# Patient Record
Sex: FEMALE | Race: WHITE | Employment: OTHER | URBAN - NONMETROPOLITAN AREA
[De-identification: names, ages, dates, MRNs, and addresses within clinical notes are randomized per-mention and may not be internally consistent; named-entity substitution may affect disease eponyms.]

---

## 2022-05-24 ENCOUNTER — OFFICE VISIT (OUTPATIENT)
Dept: CARDIOLOGY CLINIC | Age: 77
End: 2022-05-24
Payer: MEDICARE

## 2022-05-24 VITALS
WEIGHT: 184.4 LBS | DIASTOLIC BLOOD PRESSURE: 54 MMHG | HEIGHT: 62 IN | BODY MASS INDEX: 33.93 KG/M2 | SYSTOLIC BLOOD PRESSURE: 128 MMHG | HEART RATE: 56 BPM

## 2022-05-24 DIAGNOSIS — R00.0 TACHYCARDIA: Primary | ICD-10-CM

## 2022-05-24 PROCEDURE — 99204 OFFICE O/P NEW MOD 45 MIN: CPT | Performed by: INTERNAL MEDICINE

## 2022-05-24 PROCEDURE — 93000 ELECTROCARDIOGRAM COMPLETE: CPT | Performed by: INTERNAL MEDICINE

## 2022-05-24 RX ORDER — ASPIRIN 81 MG/1
81 TABLET ORAL DAILY
COMMUNITY

## 2022-05-24 RX ORDER — CHLORAL HYDRATE 500 MG
1000 CAPSULE ORAL DAILY
COMMUNITY

## 2022-05-24 RX ORDER — M-VIT,TX,IRON,MINS/CALC/FOLIC 27MG-0.4MG
1 TABLET ORAL DAILY
COMMUNITY

## 2022-05-24 RX ORDER — PANTOPRAZOLE SODIUM 20 MG/1
20 TABLET, DELAYED RELEASE ORAL DAILY
COMMUNITY

## 2022-05-24 RX ORDER — LOSARTAN POTASSIUM 50 MG/1
50 TABLET ORAL 2 TIMES DAILY
COMMUNITY

## 2022-05-24 RX ORDER — HYDROCHLOROTHIAZIDE 25 MG/1
25 TABLET ORAL 2 TIMES DAILY
COMMUNITY

## 2022-05-24 RX ORDER — BUDESONIDE, GLYCOPYRROLATE, AND FORMOTEROL FUMARATE 160; 9; 4.8 UG/1; UG/1; UG/1
AEROSOL, METERED RESPIRATORY (INHALATION) PRN
COMMUNITY

## 2022-05-24 RX ORDER — FENOFIBRATE 160 MG/1
160 TABLET ORAL DAILY
COMMUNITY

## 2022-05-24 RX ORDER — AMLODIPINE BESYLATE 5 MG/1
5 TABLET ORAL 2 TIMES DAILY
COMMUNITY

## 2022-05-24 RX ORDER — EZETIMIBE 10 MG/1
10 TABLET ORAL DAILY
COMMUNITY

## 2022-05-24 RX ORDER — ASCORBIC ACID 1000 MG
50 TABLET ORAL DAILY
COMMUNITY

## 2022-05-24 NOTE — PROGRESS NOTES
[address] [facility]  Dept: [phone]  Cardiac Electrophysiology: Consultation Note  Patient's demographics:  Date:   5/24/2022  Patient name:              Kia De Jesus  YOB: 1945  Sex:    female   MRN:   [id number]    Primary Care Physician:  No primary care provider on file. Referring Physician:  NA    Reason for Consultation:  Arrhythmia. Clinical Summary:  76/F was apparently alright until 5/15/2022 when while resting at home she started to have sudden onset of palpitation. She checked her heart rate was in the range of 130s to 140 bpm.  Her symptoms persisted for couple of hours before reporting to the local emergency room. She was noted to be in atrial flutter with rapid ventricular rate (130 bpm). She was started on intravenous Cardizem and spontaneously converted to sinus rhythm. She was referred here for further evaluation. The patient reports having palpitations for many years. Her symptoms normally would last for few minutes and spontaneously subside. Palpitations are usually associated with shortness of breath. Denies chest pain, dizziness, syncope, lower extremity swelling. No documented arrhythmias in the past.  She had an abnormal stress test a year ago and received 2 coronary stents in the same artery. Medical Hx: CAD/PCI (4/2021)-abnormal stress test, HTN, HPL, sleep apnea/CPAP, obesity, GERD, OA and axiety/depression. Review of systems:  Constitutional: Negative for chills and fever  HENT: Negative for congestion, sinus pressure, sneezing and sore throat. Eyes: Negative for pain, discharge, redness and itching. Respiratory: Negative for apnea, cough  Gastrointestinal: Negative for blood in stool, constipation, diarrhea   Endocrine: Negative for cold intolerance, heat intolerance, polydipsia. Genitourinary: Negative for dysuria, enuresis, flank pain and hematuria. Musculoskeletal: Negative for arthralgias, joint swelling and neck pain. Neurological: Negative for numbness and headaches. Psychiatric/Behavioral: Negative for agitation, confusion, decreased concentration and dysphoric mood. Past Medical History[de-identified]  CAD/PCI (4/2021)-abnormal stress test, HTN, HPL, sleep apnea/CPAP, obesity, GERD, OA and axiety/depression. Past Surgical History:  · CORE BIOPSY OF THE BREAST Left 9/22/2021   · CORONARY STENT PLACEMENT 04/13/2021   · COLONOSCOPY DIAGNOSTIC N/A 07/17/2020   Laterality: N/A; Surgeon: Troy Nicole MD; [facility]  · KNEE SURGERY 04/05/2017   · HYSTERECTOMY Bilateral 01/01/1992   · OOPHORECTOMY Bilateral 1992   · TONSILLECTOMY     Family History:  Denies family history of premature coronary artery disease, arrhythmias or sudden cardiac death. Social History:  AT . Denies smoking or alcohol use. She has 2 grownup children. Social History     Socioeconomic History    Marital status:      Spouse name: Not on file    Number of children: Not on file    Years of education: Not on file    Highest education level: Not on file   Occupational History    Not on file   Tobacco Use    Smoking status: Not on file    Smokeless tobacco: Not on file   Substance and Sexual Activity    Alcohol use: Not on file    Drug use: Not on file    Sexual activity: Not on file   Other Topics Concern    Not on file   Social History Narrative    Not on file     Social Determinants of Health     Financial Resource Strain:     Difficulty of Paying Living Expenses: Not on file   Food Insecurity:     Worried About Running Out of Food in the Last Year: Not on file    Blair of Food in the Last Year: Not on file   Transportation Needs:     Lack of Transportation (Medical): Not on file    Lack of Transportation (Non-Medical):  Not on file   Physical Activity:     Days of Exercise per Week: Not on file    Minutes of Exercise per Session: Not on file   Stress:     Feeling of Stress : Not on file   Social Connections:     Frequency of Communication with Friends and Family: Not on file    Frequency of Social Gatherings with Friends and Family: Not on file    Attends Episcopal Services: Not on file    Active Member of Clubs or Organizations: Not on file    Attends Club or Organization Meetings: Not on file    Marital Status: Not on file   Intimate Partner Violence:     Fear of Current or Ex-Partner: Not on file    Emotionally Abused: Not on file    Physically Abused: Not on file    Sexually Abused: Not on file   Housing Stability:     Unable to Pay for Housing in the Last Year: Not on file    Number of [place] in the Last Year: Not on file    Unstable Housing in the Last Year: Not on file        Allergies:  Not on File     Medications:  No current outpatient medications on file. No current facility-administered medications for this visit. Physical Examination:  Vitals:    22 0937   BP: (!) 128/54   Pulse: 56     Body mass index is 33.73 kg/m². GENERAL: Alert and oriented. No distress. EYES: No pallor or icterus. ENT: No cyanosis. No thyromegaly or cervical LAP. VESSELS: No jugular venous distension or carotid bruits. HEART: Normal S1/S2. No murmur, rub or gallop. LUNGS: Clear to auscultation. ABDOMEN: Soft and non-tender. EXTREMITIES: No lower extremity edema. Feet are warm. NEUROLOGICAL: Grossly normal.     Laboratory And Diagnostic Data  I have personally reviewed and interpreted the results of the following diagnostic testing    No results found for: WBC, HGB, HCT, PLT, CHOL, TRIG, HDL, LDLDIRECT, ALT, AST, NA, K, CL, CREATININE, BUN, CO2, TSH, INR, GLUF, LABA1C, LABMICR    EC/15/2022: Narrow complex tachycardia, likely atrial flutter (typical with RVR). ECG 2022: Sinus bradycardia (56 BPM). Otherwise normal.     Impression:  · Symptomatic narrow complex tachycardia, likely atrial flutter.   · Sinus bradycardia, asymptomatic. · CAD/PCI (4/2021). On aspirin. · HTN, HPL and obesity,   · Sleep apnea/CPAP. Assessment And Recommendations: The patient has long standing history of intermittent palpitation. Recently diagnosed to have a narrow complex, consistent with atrial flutter. We will request a 30-day event monitor, and decide about the management including anticoagulation accordingly. She does not take metoprolol because of sinus bradycardia. The event monitor will also allow us to assess her heart rate events. We will request the medical records (inlcuding echo and left heart cath) from [facility] in [facility]. The patient is agreeable with the plan. **This report has been created using voice recognition software. It may contain minor errors which are inherent in voice recognition technology. **       Electronically signed by Salvador Adams MD, [facility], [facility] on 5/24/2022 at 6:55 AM

## 2022-05-24 NOTE — PATIENT INSTRUCTIONS
30 day event monitor and get pt records from [address], pt had a coronary stent placement

## 2022-05-25 ENCOUNTER — HOSPITAL ENCOUNTER (OUTPATIENT)
Dept: NON INVASIVE DIAGNOSTICS | Age: 77
Discharge: HOME | End: 2022-05-25
Payer: MEDICARE

## 2022-05-25 DIAGNOSIS — R00.0 TACHYCARDIA: ICD-10-CM

## 2022-05-25 PROCEDURE — 93270 REMOTE 30 DAY ECG REV/REPORT: CPT

## 2022-06-30 ENCOUNTER — TELEPHONE (OUTPATIENT)
Dept: CARDIOLOGY CLINIC | Age: 77
End: 2022-06-30

## 2022-06-30 NOTE — TELEPHONE ENCOUNTER
Assessment And Recommendations: The patient has long standing history of intermittent palpitation. Recently diagnosed to have a narrow complex, consistent with atrial flutter. We will request a 30-day event monitor, and decide about the management including anticoagulation accordingly. She does not take metoprolol because of sinus bradycardia. The event monitor will also allow us to assess her heart rate events. We will request the medical records (inlcuding echo and left heart cath) from [facility] in [facility].   The patient is agreeable with the plan.     Electronically signed by Blayne Levin MD, MRCP, [facility] on 5/24/2022 at 6:55 AM       Dr. Isael Jensen  Please see event monitor results  Please advise

## 2022-06-30 NOTE — PROCEDURES
[address] [place] [address]                                 EVENT MONITOR    PATIENT NAME: Anne Marie Bowles                         :        1945  MED REC NO:   [id number]                           ROOM:  ACCOUNT NO:   [de-identified]                           ADMIT DATE: 2022  PROVIDER:     Ani Diamond M.D.    TEST TYPE:  Event monitor. ENROLLMENT PERIOD:  2022 to 2022. INDICATION FOR STUDY:  Tachycardia. INTERPRETATION:  The predominant rhythm is sinus with an average heart  rate of 60 beats per minute. The heart rate is varying between 50 to  110 beats per minute. Short runs of narrow complex tachycardia,  consistent with atrial flutter/atrial tachycardia with the total burden  of less than 1%. One 3-beat run of wide complex tachycardia, likely  ventricular triplets. Episodes of sinus bradycardia primarily noted  during sleeping hours, lowest heart rate 33 beats noted at 07:15 a.m. on  2022.         Maurizio Novoa M.D.    D: 2022 8:20:20       T: 2022 9:30:17     [email or web address]  Job#: [id number]     Doc#: [id number]    CC:

## 2022-07-01 NOTE — TELEPHONE ENCOUNTER
Received echo and [facility] report, scanned into media and attached to this telephone encounter. Did not come through very good, so I called Tammy (RN name on fax cover sheet) and LMOM to have her resend the fax just in case.

## 2022-07-01 NOTE — TELEPHONE ENCOUNTER
Call out to [facility] in [facility]-   transferred me to cardiology, spoke to another  who asked for the ordering physician's name- did not have it. Spoke with the patient- Dr. Angie Petersen is her cardiologist  Suzette Suarez back, spoke with same  again, who then was able to transfer me to Dr. Del Valle Floor nurse phone  [facility] for her to call me back TODAY or fax me the echo and cath report.

## 2022-07-05 NOTE — TELEPHONE ENCOUNTER
Dr. Donya Ureña,   Patient can not afford the Eliquis. (patient does not want to fill out the patient assistance forms,  She does not wish to share her income info)    What are the alternatives for her?

## 2022-07-05 NOTE — TELEPHONE ENCOUNTER
Patient lives in [address] with Jose Montes De Oca to see what coumadin clinic he would like to attend? ?  Patient is now rethinking the Eliquis-  She will contact me back with an answer. Coumadin? Or Eliquis?

## 2022-07-07 NOTE — TELEPHONE ENCOUNTER
Patient returned call-  She LMOM would like to further discuss the eliquis patient asst.      I did try to call her back - had to [facility]

## 2022-07-21 NOTE — TELEPHONE ENCOUNTER
Patient assistance forms have been filled out and faxed to [facility].   [phone]

## 2022-09-14 ENCOUNTER — OFFICE VISIT (OUTPATIENT)
Dept: CARDIOLOGY CLINIC | Age: 77
End: 2022-09-14
Payer: MEDICARE

## 2022-09-14 VITALS
BODY MASS INDEX: 33.57 KG/M2 | HEART RATE: 63 BPM | DIASTOLIC BLOOD PRESSURE: 59 MMHG | SYSTOLIC BLOOD PRESSURE: 115 MMHG | HEIGHT: 62 IN | WEIGHT: 182.4 LBS

## 2022-09-14 DIAGNOSIS — R00.0 TACHYCARDIA: Primary | ICD-10-CM

## 2022-09-14 PROCEDURE — 93000 ELECTROCARDIOGRAM COMPLETE: CPT | Performed by: INTERNAL MEDICINE

## 2022-09-14 PROCEDURE — 99214 OFFICE O/P EST MOD 30 MIN: CPT | Performed by: INTERNAL MEDICINE

## 2022-09-14 PROCEDURE — 1123F ACP DISCUSS/DSCN MKR DOCD: CPT | Performed by: INTERNAL MEDICINE

## 2022-09-14 RX ORDER — HYDRALAZINE HYDROCHLORIDE 25 MG/1
25 TABLET, FILM COATED ORAL 2 TIMES DAILY
COMMUNITY

## 2022-09-14 NOTE — PROGRESS NOTES
[address] ()  [address] [address]  Dept: [phone]  Cardiac Electrophysiology: Follow up Note  Patient's demographics:  Date:   9/14/2022  Patient name:              Arsenio Cardenas  YOB: 1945  Sex:    female   MRN:   [id number]    Primary Care Physician:  Sharona Burt    Reason for follow up:  Atrial flutter/atrial tachycardia. Clinical Summary:  76/F with symptomatic narrow complex tachycardia, consistent with atrial tachycardia/atrial flutter on apixaban. She was last seen in the EP clinic in 5/20/2022. The patient is feeling good today. She has mild sinus congestion. No palpitations, chest pain, shortness of breath or lightheadedness. Compliant with apixaban and is tolerating well. No bleeding from any site. Medical Hx: AT/AFL (5/2022), CAD/PCI (4/2021)-abnormal stress test, HTN, HPL, sleep apnea/CPAP, obesity, GERD, OA and axiety/depression. Review of systems:  Constitutional: Negative for chills and fever  HENT: Negative for congestion, sinus pressure, sneezing and sore throat. Eyes: Negative for pain, discharge, redness and itching. Respiratory: Negative for apnea, cough  Gastrointestinal: Negative for blood in stool, constipation, diarrhea   Endocrine: Negative for cold intolerance, heat intolerance, polydipsia. Genitourinary: Negative for dysuria, enuresis, flank pain and hematuria. Musculoskeletal: Negative for arthralgias, joint swelling and neck pain. Neurological: Negative for numbness and headaches. Psychiatric/Behavioral: Negative for agitation, confusion, decreased concentration and dysphoric mood. Past Medical History[de-identified]  CAD/PCI (4/2021)-abnormal stress test, HTN, HPL, sleep apnea/CPAP, obesity, GERD, OA and axiety/depression.      Past Surgical History:  CORE BIOPSY OF THE BREAST Left 9/22/2021   CORONARY STENT PLACEMENT 04/13/2021   COLONOSCOPY DIAGNOSTIC N/A 07/17/2020   Laterality: N/A; Surgeon: Claus Sands MD; Location: [facility]  KNEE SURGERY 04/05/2017   HYSTERECTOMY Bilateral 01/01/1992   OOPHORECTOMY Bilateral 1992   TONSILLECTOMY     Family History:  Denies family history of premature coronary artery disease, arrhythmias or sudden cardiac death. Social History:  AT . Denies smoking or alcohol use. She has 2 grownup children. Social History     Socioeconomic History    Marital status:    Tobacco Use    Smoking status: Never   Vaping Use    Vaping Use: Never used   Substance and Sexual Activity    Alcohol use: Never        Allergies: Allergies   Allergen Reactions    Statins      Pt gets dementia with statins         Medications:  Current Outpatient Medications   Medication Sig Dispense Refill    apixaban (ELIQUIS) 5 MG TABS tablet Take 1 tablet by mouth 2 times daily 60 tablet 3    losartan (COZAAR) 50 MG tablet Take 50 mg by mouth 2 times daily      amLODIPine (NORVASC) 5 MG tablet Take 5 mg by mouth 2 times daily at 0800 and 1400      aspirin 81 MG EC tablet Take 81 mg by mouth daily      fenofibrate (TRIGLIDE) 160 MG tablet Take 160 mg by mouth daily      hydroCHLOROthiazide (HYDRODIURIL) 25 MG tablet Take 25 mg by mouth daily      ezetimibe (ZETIA) 10 MG tablet Take 10 mg by mouth daily      pantoprazole (PROTONIX) 20 MG tablet Take 20 mg by mouth daily      Multiple Vitamins-Minerals (THERAPEUTIC MULTIVITAMIN-MINERALS) tablet Take 1 tablet by mouth daily      Omega-3 1000 MG CAPS Take 1,000 mg by mouth daily      sertraline (ZOLOFT) 50 MG tablet Take 50 mg by mouth daily      Coenzyme Q10 (CO Q 10) 10 MG CAPS Take 50 mg by mouth daily      Budeson-Glycopyrrol-Formoterol (BREZTRI AEROSPHERE) 160-9-4.8 MCG/ACT AERO Inhale into the lungs as needed       No current facility-administered medications for this visit. Physical Examination:  Vitals:    09/14/22 1134   BP: (!) 115/59   Pulse: 63     Body mass index is 33.36 kg/m².     GENERAL: Alert and oriented. No distress. EYES: No pallor or icterus. ENT: No cyanosis. No thyromegaly or cervical LAP. VESSELS: No jugular venous distension or carotid bruits. HEART: Normal S1/S2. No murmur, rub or gallop. LUNGS: Clear to auscultation. ABDOMEN: Soft and non-tender. EXTREMITIES: No lower extremity edema. Feet are warm. NEUROLOGICAL: Grossly normal.     Laboratory And Diagnostic Data  I have personally reviewed and interpreted the results of the following diagnostic testing    No results found for: WBC, HGB, HCT, PLT, CHOL, TRIG, HDL, LDLDIRECT, ALT, AST, NA, K, CL, CREATININE, BUN, CO2, TSH, INR, GLUF, LABA1C, LABMICR    Echo 2022: LVEF 55%. EC/15/2022: Narrow complex tachycardia, likely atrial flutter (typical with RVR). ECG 2022: Sinus bradycardia (56 BPM). Otherwise normal.   Holter 22: Paroxysms of atrial flutter/atrial tachycardia (burden 1%). Coronary angiography 2022: LAD-PCI    Impression:  Low burden atrial flutter, [id number] score= 4 (age, sex, HTN). On apixaban. Asymptomatic sinus bradycardia, resolved. CAD/PCI-LAD (2021). On aspirin. HTN, HPL and obesity,   Sleep apnea/CPAP. Assessment And Recommendations:  Patient reports no palpitation. Her overall atrial flutter burden is low. Compliant with anticoagulation. No bleeding. In sinus rhythm today. Since, her symptoms are mild we will hold on antiarrhythmic therapy at this time. I will see her back in 12 months. Thank you for allowing me to participate in the care of your patient. Please call me if you have any questions. **This report has been created using voice recognition software. It may contain minor errors which are inherent in voice recognition technology. **       Nury Og MD, MRCP, [facility], [facility] on 2022 at 11:45 AM

## 2022-11-14 ENCOUNTER — TELEPHONE (OUTPATIENT)
Dept: CARDIOLOGY CLINIC | Age: 77
End: 2022-11-14

## 2022-11-14 NOTE — TELEPHONE ENCOUNTER
Margoth Watt with [facility] called. Pt is supposed to have an obstructive kidney stone removed tomorrow 11/15/22 at [facility]. Had EKG done 11/11/22 and they were wondering if Dr Davida Spivey could take a look and compare to previous and make sure if was okay to proceed with surgery. EKG in media tab and on your desk. Please review.      Please call Margoth Watt back [phone]

## 2023-09-12 ENCOUNTER — TELEPHONE (OUTPATIENT)
Dept: CARDIOLOGY CLINIC | Age: 78
End: 2023-09-12